# Patient Record
Sex: FEMALE | Race: WHITE | NOT HISPANIC OR LATINO | Employment: FULL TIME | URBAN - METROPOLITAN AREA
[De-identification: names, ages, dates, MRNs, and addresses within clinical notes are randomized per-mention and may not be internally consistent; named-entity substitution may affect disease eponyms.]

---

## 2019-01-08 ENCOUNTER — OFFICE VISIT (OUTPATIENT)
Dept: NEUROSURGERY | Facility: CLINIC | Age: 34
End: 2019-01-08

## 2019-01-08 VITALS
BODY MASS INDEX: 44.41 KG/M2 | TEMPERATURE: 97.4 F | HEIGHT: 68 IN | WEIGHT: 293 LBS | DIASTOLIC BLOOD PRESSURE: 98 MMHG | SYSTOLIC BLOOD PRESSURE: 140 MMHG

## 2019-01-08 DIAGNOSIS — M51.26 HERNIATED LUMBAR INTERVERTEBRAL DISC: Primary | ICD-10-CM

## 2019-01-08 DIAGNOSIS — M51.36 DEGENERATIVE DISC DISEASE, LUMBAR: ICD-10-CM

## 2019-01-08 PROCEDURE — 99243 OFF/OP CNSLTJ NEW/EST LOW 30: CPT | Performed by: NEUROLOGICAL SURGERY

## 2019-01-08 RX ORDER — METHOCARBAMOL 750 MG/1
750 TABLET, FILM COATED ORAL NIGHTLY
Qty: 30 TABLET | Refills: 0 | Status: SHIPPED | OUTPATIENT
Start: 2019-01-08

## 2019-01-08 RX ORDER — INDOMETHACIN 50 MG/1
50 CAPSULE ORAL 3 TIMES DAILY PRN
COMMUNITY
End: 2019-01-08 | Stop reason: CLARIF

## 2019-01-08 RX ORDER — NABUMETONE 750 MG/1
750 TABLET, FILM COATED ORAL 2 TIMES DAILY
Qty: 60 TABLET | Refills: 0 | Status: SHIPPED | OUTPATIENT
Start: 2019-01-08

## 2019-01-08 NOTE — PATIENT INSTRUCTIONS
After seeing Dr. Wilcox and having injections:     Call Dr. Hall on a Monday or Tuesday with an update.    Ask for Annabelle () and leave a message for  Dr. Hall.   He will call you back at the end of the day as soon as he can.     [phone]

## 2019-01-08 NOTE — PROGRESS NOTES
Shruti Davis  1985  [id number]      Chief Complaint   Patient presents with   • Back Pain       HISTORY OF PRESENT ILLNESS:  This 33-year-old female presenting with a one-year history of severe pain in her back rating into her left leg which is failed to respond to conservativism consisting primarily of physical therapy and chiropractic.  Bar MRI was performed and she is referred for neurosurgical consultation.  She has pain rating from her left buttock into the left leg including her knee and ankle.  Pain is quite severe with prolonged sitting standing and walking.  She has continued to work in the [employer] business.     Past Medical History:   Diagnosis Date   • Bronchitis    • Low back pain        History reviewed. No pertinent surgical history.    Family History   Problem Relation Age of Onset   • Diabetes Mother    • Stroke Mother    • Hypertension Mother        Social History     Socioeconomic History   • Marital status:      Spouse name: Not on file   • Number of children: Not on file   • Years of education: Not on file   • Highest education level: Not on file   Social Needs   • Financial resource strain: Not on file   • Food insecurity - worry: Not on file   • Food insecurity - inability: Not on file   • Transportation needs - medical: Not on file   • Transportation needs - non-medical: Not on file   Occupational History   • Not on file   Tobacco Use   • Smoking status: Never Smoker   • Smokeless tobacco: Never Used   Substance and Sexual Activity   • Alcohol use: No     Frequency: Never   • Drug use: No   • Sexual activity: Defer   Other Topics Concern   • Not on file   Social History Narrative   • Not on file       Allergies   Allergen Reactions   • Erythromycin Other (See Comments)     Ask pt           Current Outpatient Medications:   •  indomethacin (INDOCIN) 50 MG capsule, Take 50 mg by mouth 3 (Three) Times a Day As Needed for Mild Pain ., Disp: , Rfl:     Review of Systems    Constitutional: Positive for activity change and fatigue. Negative for appetite change, chills, diaphoresis, fever and unexpected weight change.   HENT: Negative for congestion, dental problem, drooling, ear discharge, ear pain, facial swelling, hearing loss, mouth sores, nosebleeds, postnasal drip, rhinorrhea, sinus pressure, sneezing, sore throat, tinnitus, trouble swallowing and voice change.    Eyes: Negative for photophobia, pain, discharge, redness, itching and visual disturbance.   Respiratory: Negative for apnea, cough, choking, chest tightness, shortness of breath, wheezing and stridor.    Cardiovascular: Negative for chest pain, palpitations and leg swelling.   Gastrointestinal: Negative for abdominal distention, abdominal pain, anal bleeding, blood in stool, constipation, diarrhea, nausea, rectal pain and vomiting.   Endocrine: Negative for cold intolerance, heat intolerance, polydipsia, polyphagia and polyuria.   Genitourinary: Negative for decreased urine volume, difficulty urinating, dysuria, enuresis, flank pain, frequency, genital sores, hematuria and urgency.   Musculoskeletal: Negative for arthralgias, back pain, gait problem, joint swelling, myalgias, neck pain and neck stiffness.   Skin: Negative for color change, pallor, rash and wound.   Allergic/Immunologic: Negative for environmental allergies, food allergies and immunocompromised state.   Neurological: Positive for light-headedness and numbness. Negative for dizziness, tremors, seizures, syncope, facial asymmetry, speech difficulty, weakness and headaches.   Hematological: Negative for adenopathy. Does not bruise/bleed easily.   Psychiatric/Behavioral: Negative for agitation, behavioral problems, confusion, decreased concentration, dysphoric mood, hallucinations, self-injury, sleep disturbance and suicidal ideas. The patient is not nervous/anxious and is not hyperactive.        There were no vitals filed for this visit.    Neurological  Examination:    Mental status/speech: The patient is alert and oriented.  Speech is clear without aphysia or dysarthria.  No overt cognitive deficits.    Cranial nerve examination:    Olfaction: Smell is intact.  Vision: Vision is intact without visual field abnormalities.  Funduscopic examination is normal.  No pupillary irregularity.  Ocular motor examination: The extraocular muscles are intact.  There is no diplopia.  The pupil is round and reactive to both light and accommodation.  There is no nystagmus.  Facial movement/sensation: There is no facial weakness.  Sensation is intact in the first, second, and third divisions of the trigeminal nerve.  The corneal reflex is intact.  Auditory: Hearing is intact to finger rub bilaterally.  Cranial nerves IX, X, XI, XII: Phonation is normal.  No dysphagia.  Tongue is protruded in the midline without atrophy.  The gag reflex is intact.  Shoulder shrug is normal.    Musculoligamentous ligamentous examination: Straight leg raising is positive on the left side; negative on the left.  There is no weakness sensory loss or reflex asymmetry.  Her gait is normal.    Medical Decision Making:     Diagnostic Data Set:  The lumbar MRI data set shows a presence of a disc herniation L5-S1 deviating toward the left which provides clinical correlation.  In addition she also has degenerative disc disease at other levels indicating a genetic predisposition.      Assessment:  Herniated intervertebral disc L5-S1, left          Recommendations:  Recommended surgical intervention.  She would like to forestall that if at all possible because of her work requirements.  I've given her a prescription of Relafen 750 mg twice a day, Robaxin 750 mg at night and have sent her for an epidural steroid injection by Jose Miguel Wilcox.  She will call me afterward inform me of her progress.  If her symptoms persist surgery would be a consideration.  Obviously she will need to take time from work to allow  this to mend.  I will keep you informed        I greatly appreciate the opportunity to see and evaluate this individual.  If you have questions or concerns regarding issues that I may have overlooked please call me at any time: [phone].  Jimmie Hall M.D.  [facility]  [address].  [place] [address]    Scribed for Rudy Hall MD by Delores Sargent CMA. 1/8/2019 10:59 AM     I have read and concur with the information provided by the scribe.  Rudy Hall MD

## 2019-01-23 ENCOUNTER — TELEPHONE (OUTPATIENT)
Dept: NEUROSURGERY | Facility: CLINIC | Age: 34
End: 2019-01-23

## 2019-01-23 NOTE — TELEPHONE ENCOUNTER
Called pt - left msg that we will send medications to pharmacy and to call us in a week or so with an update or if symptoms worsen.     Called Gabapentin into pharmacy.

## 2019-01-23 NOTE — TELEPHONE ENCOUNTER
Provider:  Rafael  Caller: Shruti    Phone #:  [phone]   Last visit:  1/8/19   Next visit: n/a    ИРИНА:         Reason for call:           Pt called stating that the medications are not relieving her pain, inquiring if there is anything else that we could provide her that would provide some relief

## 2019-01-23 NOTE — TELEPHONE ENCOUNTER
We can try some Neurontin. Please call in Neurontin 300mg QHS #[id number] no refill. Have her update us in a week. We can increase as needed.

## 2019-02-28 RX ORDER — GABAPENTIN 300 MG/1
300 CAPSULE ORAL
COMMUNITY
End: 2019-02-28 | Stop reason: SDUPTHER

## 2019-02-28 RX ORDER — GABAPENTIN 300 MG/1
300 CAPSULE ORAL
Qty: 30 CAPSULE | Refills: 0 | OUTPATIENT
Start: 2019-02-28

## 2019-02-28 NOTE — TELEPHONE ENCOUNTER
Called pt for update and inform of refill.  Left msg to call office.     Called rx into pharmacy.

## 2019-02-28 NOTE — TELEPHONE ENCOUNTER
Could we ask patient for an update? Dr. Hall stated in his last note that he would like to see her back for surgical planning if symptoms persist

## 2019-02-28 NOTE — TELEPHONE ENCOUNTER
Provider:  Rafael  Caller:  Pt   Surgery:  NA  Surgery Date:    Last visit:  01/08/19  Next visit: NA    Reason for call:     Pt left msg stating rx really helps.  She needs a refill.     Requested Prescriptions     Pending Prescriptions Disp Refills   • gabapentin (NEURONTIN) 300 MG capsule 30 capsule 0     Sig: Take 1 capsule by mouth every night at bedtime.     ИРИНА:     01/24/2019 Gabapentin 300MG 1985 [address] [facility] [id number] [facility] 1